# Patient Record
Sex: FEMALE | Race: WHITE | NOT HISPANIC OR LATINO | URBAN - METROPOLITAN AREA
[De-identification: names, ages, dates, MRNs, and addresses within clinical notes are randomized per-mention and may not be internally consistent; named-entity substitution may affect disease eponyms.]

---

## 2021-05-20 ENCOUNTER — WEB ENCOUNTER (OUTPATIENT)
Dept: URBAN - METROPOLITAN AREA CLINIC 94 | Facility: CLINIC | Age: 86
End: 2021-05-20

## 2021-05-20 ENCOUNTER — OFFICE VISIT (OUTPATIENT)
Dept: URBAN - METROPOLITAN AREA CLINIC 94 | Facility: CLINIC | Age: 86
End: 2021-05-20

## 2021-05-20 ENCOUNTER — OFFICE VISIT (OUTPATIENT)
Dept: URBAN - METROPOLITAN AREA CLINIC 94 | Facility: CLINIC | Age: 86
End: 2021-05-20
Payer: COMMERCIAL

## 2021-05-20 VITALS
SYSTOLIC BLOOD PRESSURE: 180 MMHG | WEIGHT: 156 LBS | DIASTOLIC BLOOD PRESSURE: 78 MMHG | TEMPERATURE: 98.3 F | HEART RATE: 74 BPM | BODY MASS INDEX: 29.45 KG/M2 | HEIGHT: 61 IN

## 2021-05-20 DIAGNOSIS — R11.0 NAUSEA: ICD-10-CM

## 2021-05-20 PROCEDURE — 99204 OFFICE O/P NEW MOD 45 MIN: CPT | Performed by: INTERNAL MEDICINE

## 2021-05-20 RX ORDER — IBUPROFEN 200 MG/1
1 TABLET WITH FOOD OR MILK AS NEEDED TABLET, FILM COATED ORAL THREE TIMES A DAY
Status: ACTIVE | COMMUNITY

## 2021-05-20 RX ORDER — ESOMEPRAZOLE MAGNESIUM 40 MG/1
1 CAPSULE CAPSULE, DELAYED RELEASE ORAL ONCE A DAY
Status: ACTIVE | COMMUNITY

## 2021-05-20 RX ORDER — LOSARTAN POTASSIUM 100 MG/1
1 TABLET TABLET ORAL ONCE A DAY
Status: ACTIVE | COMMUNITY

## 2021-05-20 RX ORDER — ONDANSETRON 4 MG/1
1 TABLET ON THE TONGUE AND ALLOW TO DISSOLVE TABLET, ORALLY DISINTEGRATING ORAL ONCE A DAY
Status: ACTIVE | COMMUNITY

## 2021-05-20 RX ORDER — HYDROXYZINE HYDROCHLORIDE 25 MG/1
1 TABLET AS NEEDED TABLET, FILM COATED ORAL
Status: ACTIVE | COMMUNITY

## 2021-05-20 RX ORDER — MECLIZINE HCL 25MG 25 MG/1
1 TABLET AS NEEDED TABLET, CHEWABLE ORAL ONCE A DAY
Status: ACTIVE | COMMUNITY

## 2021-05-20 RX ORDER — ZOLPIDEM TARTRATE 10 MG/1
1 TABLET AT BEDTIME AS NEEDED TABLET, FILM COATED ORAL ONCE A DAY
Status: ACTIVE | COMMUNITY

## 2021-05-20 RX ORDER — LEVOTHYROXINE SODIUM 0.07 MG/1
1 TABLET IN THE MORNING ON AN EMPTY STOMACH TABLET ORAL ONCE A DAY
Status: ACTIVE | COMMUNITY

## 2021-05-20 RX ORDER — HYDROCHLOROTHIAZIDE 25 MG/1
1 TABLET IN THE MORNING TABLET ORAL ONCE A DAY
Status: ACTIVE | COMMUNITY

## 2021-05-20 RX ORDER — LORATADINE 10 MG
1 TABLET TABLET ORAL ONCE A DAY
Status: ACTIVE | COMMUNITY

## 2021-05-20 NOTE — HPI-TODAY'S VISIT:
86 y/o F with hx of colon polyps, hypothyroidism, CCY, hysterectomy, ascending aortic aneursym(5.3cm, most recent) referred here for nausea by Terell Martin([place])  Patient reports for the last 18 months has had almost daily nausea without any significant vomiting. Not related to meals or any GI symptoms. Does report a significant vertigo attack last year, and has occasional dizziness, which can make nausea more. Has been taking meclizine and zofran, which helps  Constipation is well controlled on daily miralax. Denies any abdominal pain, weight loss, diarrhea.   Has history of several polyps removed during colonoscopies. Per patient, has had polyps during previous colonoscopies, though last one a few years ago looked good and was told did not need one Denies any prior EGD.

## 2021-08-03 ENCOUNTER — OFFICE VISIT (OUTPATIENT)
Dept: URBAN - METROPOLITAN AREA MEDICAL CENTER 34 | Facility: MEDICAL CENTER | Age: 86
End: 2021-08-03

## 2022-10-24 ENCOUNTER — OFFICE VISIT (OUTPATIENT)
Dept: URBAN - METROPOLITAN AREA CLINIC 70 | Facility: CLINIC | Age: 87
End: 2022-10-24
Payer: COMMERCIAL

## 2022-10-24 ENCOUNTER — LAB OUTSIDE AN ENCOUNTER (OUTPATIENT)
Dept: URBAN - METROPOLITAN AREA CLINIC 70 | Facility: CLINIC | Age: 87
End: 2022-10-24

## 2022-10-24 VITALS
HEIGHT: 61 IN | SYSTOLIC BLOOD PRESSURE: 178 MMHG | BODY MASS INDEX: 29.23 KG/M2 | HEART RATE: 74 BPM | WEIGHT: 154.8 LBS | DIASTOLIC BLOOD PRESSURE: 83 MMHG | TEMPERATURE: 98.5 F

## 2022-10-24 DIAGNOSIS — K59.09 CHRONIC CONSTIPATION: ICD-10-CM

## 2022-10-24 DIAGNOSIS — R11.0 NAUSEA: ICD-10-CM

## 2022-10-24 PROCEDURE — 99214 OFFICE O/P EST MOD 30 MIN: CPT | Performed by: NURSE PRACTITIONER

## 2022-10-24 RX ORDER — HYDROCHLOROTHIAZIDE 25 MG/1
1 TABLET IN THE MORNING TABLET ORAL ONCE A DAY
Status: ACTIVE | COMMUNITY

## 2022-10-24 RX ORDER — ZOLPIDEM TARTRATE 10 MG/1
1 TABLET AT BEDTIME AS NEEDED TABLET, FILM COATED ORAL ONCE A DAY
Status: ACTIVE | COMMUNITY

## 2022-10-24 RX ORDER — ONDANSETRON 4 MG/1
1 TABLET ON THE TONGUE AND ALLOW TO DISSOLVE TABLET, ORALLY DISINTEGRATING ORAL ONCE A DAY
Status: ON HOLD | COMMUNITY

## 2022-10-24 RX ORDER — LEVOTHYROXINE SODIUM 0.07 MG/1
1 TABLET IN THE MORNING ON AN EMPTY STOMACH TABLET ORAL ONCE A DAY
Status: ACTIVE | COMMUNITY

## 2022-10-24 RX ORDER — OXYBUTYNIN CHLORIDE 10 MG/1
1 TABLET TABLET, EXTENDED RELEASE ORAL ONCE A DAY
Status: ACTIVE | COMMUNITY

## 2022-10-24 RX ORDER — LACTULOSE 10 G/15ML
30ML SOLUTION ORAL TWICE A DAY
Qty: 5400 ML | Refills: 3 | OUTPATIENT
Start: 2022-10-24 | End: 2023-10-19

## 2022-10-24 RX ORDER — MECLIZINE HCL 25MG 25 MG/1
1 TABLET AS NEEDED TABLET, CHEWABLE ORAL ONCE A DAY
Status: ACTIVE | COMMUNITY

## 2022-10-24 RX ORDER — HYDROXYZINE HYDROCHLORIDE 25 MG/1
1 TABLET AS NEEDED TABLET, FILM COATED ORAL
Status: ACTIVE | COMMUNITY

## 2022-10-24 RX ORDER — LOSARTAN POTASSIUM 100 MG/1
1 TABLET TABLET ORAL ONCE A DAY
Status: ACTIVE | COMMUNITY

## 2022-10-24 RX ORDER — IBUPROFEN 200 MG/1
1 TABLET WITH FOOD OR MILK AS NEEDED TABLET, FILM COATED ORAL THREE TIMES A DAY
Status: ACTIVE | COMMUNITY

## 2022-10-24 RX ORDER — LORATADINE 10 MG
1 TABLET TABLET ORAL ONCE A DAY
Status: ACTIVE | COMMUNITY

## 2022-10-24 RX ORDER — ESOMEPRAZOLE MAGNESIUM 40 MG/1
1 CAPSULE CAPSULE, DELAYED RELEASE ORAL ONCE A DAY
Status: ACTIVE | COMMUNITY

## 2022-10-24 NOTE — HPI-TODAY'S VISIT:
OV  5/20/21 Dr. David: 86 y/o F with hx of colon polyps, hypothyroidism, CCY, hysterectomy, ascending aortic aneursym(5.3cm, most recent) referred here for nausea by Terell Jacome)  Patient reports for the last 18 months has had almost daily nausea without any significant vomiting. Not related to meals or any GI symptoms. Does report a significant vertigo attack last year, and has occasional dizziness, which can make nausea more. Has been taking meclizine and zofran, which helps  Constipation is well controlled on daily miralax. Denies any abdominal pain, weight loss, diarrhea.   Has history of several polyps removed during colonoscopies. Per patient, has had polyps during previous colonoscopies, though last one a few years ago looked good and was told did not need one Denies any prior EGD. --------------------------------------------------------- Today 10/24/22: Patient presents today for continued nausea. Prior EGD was not completed. Nausea is chronic and stable. No vomiting. Not always correlated with eating. s/p CCY. Admits to chronic constipation with BM x 1/week. States she has had prior impactions requiring manual manuvers to improve. Takes daily stool softener w/o improvement. Has tried fiber, miralax, and linzess in the past with no improvement. No narcotics. Has a hx of ascending aortic aneursym with last evaluation 05/2021 by surgeon Dr. Cain showing aneursym stable at 5.3cm and not candidate for surgery until 5.5cm. Denies fever, CP, SOB, wt loss, abdominal pain, dysphagia, diarrhea, signs of GI bleeding.

## 2022-12-05 ENCOUNTER — OFFICE VISIT (OUTPATIENT)
Dept: URBAN - METROPOLITAN AREA CLINIC 70 | Facility: CLINIC | Age: 87
End: 2022-12-05
Payer: COMMERCIAL

## 2022-12-05 ENCOUNTER — DASHBOARD ENCOUNTERS (OUTPATIENT)
Age: 87
End: 2022-12-05

## 2022-12-05 VITALS
SYSTOLIC BLOOD PRESSURE: 168 MMHG | WEIGHT: 153.6 LBS | DIASTOLIC BLOOD PRESSURE: 78 MMHG | HEART RATE: 70 BPM | TEMPERATURE: 97.5 F | HEIGHT: 61 IN | BODY MASS INDEX: 29 KG/M2

## 2022-12-05 DIAGNOSIS — K59.09 CHRONIC CONSTIPATION: ICD-10-CM

## 2022-12-05 DIAGNOSIS — R11.0 NAUSEA: ICD-10-CM

## 2022-12-05 PROCEDURE — 99214 OFFICE O/P EST MOD 30 MIN: CPT | Performed by: NURSE PRACTITIONER

## 2022-12-05 RX ORDER — ESOMEPRAZOLE MAGNESIUM 40 MG/1
1 CAPSULE CAPSULE, DELAYED RELEASE ORAL ONCE A DAY
Status: ACTIVE | COMMUNITY

## 2022-12-05 RX ORDER — LORATADINE 10 MG
1 TABLET TABLET ORAL ONCE A DAY
Status: ACTIVE | COMMUNITY

## 2022-12-05 RX ORDER — OXYBUTYNIN CHLORIDE 10 MG/1
1 TABLET TABLET, EXTENDED RELEASE ORAL ONCE A DAY
Status: ACTIVE | COMMUNITY

## 2022-12-05 RX ORDER — HYDROXYZINE HYDROCHLORIDE 25 MG/1
1 TABLET AS NEEDED TABLET, FILM COATED ORAL
Status: ACTIVE | COMMUNITY

## 2022-12-05 RX ORDER — IBUPROFEN 200 MG/1
1 TABLET WITH FOOD OR MILK AS NEEDED TABLET, FILM COATED ORAL THREE TIMES A DAY
Status: ACTIVE | COMMUNITY

## 2022-12-05 RX ORDER — LOSARTAN POTASSIUM 100 MG/1
1 TABLET TABLET ORAL ONCE A DAY
Status: ACTIVE | COMMUNITY

## 2022-12-05 RX ORDER — MECLIZINE HCL 25MG 25 MG/1
1 TABLET AS NEEDED TABLET, CHEWABLE ORAL ONCE A DAY
Status: ACTIVE | COMMUNITY

## 2022-12-05 RX ORDER — HYDROCHLOROTHIAZIDE 25 MG/1
1 TABLET IN THE MORNING TABLET ORAL ONCE A DAY
Status: ACTIVE | COMMUNITY

## 2022-12-05 RX ORDER — LACTULOSE 10 G/15ML
30ML SOLUTION ORAL TWICE A DAY
OUTPATIENT
Start: 2022-10-24

## 2022-12-05 RX ORDER — ZOLPIDEM TARTRATE 10 MG/1
1 TABLET AT BEDTIME AS NEEDED TABLET, FILM COATED ORAL ONCE A DAY
Status: ACTIVE | COMMUNITY

## 2022-12-05 RX ORDER — LACTULOSE 10 G/15ML
30ML SOLUTION ORAL TWICE A DAY
Qty: 5400 ML | Refills: 3 | Status: ACTIVE | COMMUNITY
Start: 2022-10-24 | End: 2023-10-19

## 2022-12-05 RX ORDER — LEVOTHYROXINE SODIUM 0.07 MG/1
1 TABLET IN THE MORNING ON AN EMPTY STOMACH TABLET ORAL ONCE A DAY
Status: ACTIVE | COMMUNITY

## 2022-12-05 RX ORDER — ONDANSETRON 4 MG/1
1 TABLET ON THE TONGUE AND ALLOW TO DISSOLVE TABLET, ORALLY DISINTEGRATING ORAL ONCE A DAY
Status: ON HOLD | COMMUNITY

## 2022-12-05 NOTE — HPI-TODAY'S VISIT:
OV  5/20/21 Dr. David: 84 y/o F with hx of colon polyps, hypothyroidism, CCY, hysterectomy, ascending aortic aneursym(5.3cm, most recent) referred here for nausea by Terell Jacome) Patient reports for the last 18 months has had almost daily nausea without any significant vomiting. Not related to meals or any GI symptoms. Does report a significant vertigo attack last year, and has occasional dizziness, which can make nausea more. Has been taking meclizine and zofran, which helps Constipation is well controlled on daily miralax. Denies any abdominal pain, weight loss, diarrhea.  Has history of several polyps removed during colonoscopies. Per patient, has had polyps during previous colonoscopies, though last one a few years ago looked good and was told did not need one Denies any prior EGD. --------------------------------------------------------- OV 10/24/22: Patient presents today for continued nausea. Prior EGD was not completed. Nausea is chronic and stable. No vomiting. Not always correlated with eating. s/p CCY. Admits to chronic constipation with BM x 1/week. States she has had prior impactions requiring manual manuvers to improve. Takes daily stool softener w/o improvement. Has tried fiber, miralax, and linzess in the past with no improvement. No narcotics. Has a hx of ascending aortic aneursym with last evaluation 05/2021 by surgeon Dr. Cain showing aneursym stable at 5.3cm and not candidate for surgery until 5.5cm. Denies fever, CP, SOB, wt loss, abdominal pain, dysphagia, diarrhea, signs of GI bleeding. -------------------------------------------------------- Today 12/5/22: Patient presents today for follow up. She reports feeling better. Constipation is resolved with taking lactulose and nausea is improved and now rare. Tolerating diet. No new or current GI complaints.

## 2023-03-28 NOTE — PHYSICAL EXAM MUSCULOSKELETAL:
Culture Follow-up
normal gait and station , no tenderness or deformities present
[FreeTextEntry2] :  NP right hand/thumb